# Patient Record
Sex: FEMALE | Race: WHITE | NOT HISPANIC OR LATINO | URBAN - METROPOLITAN AREA
[De-identification: names, ages, dates, MRNs, and addresses within clinical notes are randomized per-mention and may not be internally consistent; named-entity substitution may affect disease eponyms.]

---

## 2018-06-16 ENCOUNTER — EMERGENCY (EMERGENCY)
Facility: HOSPITAL | Age: 73
LOS: 1 days | Discharge: HOME | End: 2018-06-16
Attending: EMERGENCY MEDICINE | Admitting: EMERGENCY MEDICINE
Payer: MEDICARE

## 2018-06-16 VITALS
OXYGEN SATURATION: 97 % | DIASTOLIC BLOOD PRESSURE: 72 MMHG | TEMPERATURE: 98 F | HEART RATE: 75 BPM | RESPIRATION RATE: 16 BRPM | SYSTOLIC BLOOD PRESSURE: 113 MMHG

## 2018-06-16 LAB
ALBUMIN SERPL ELPH-MCNC: 4.3 G/DL — SIGNIFICANT CHANGE UP (ref 3.3–5)
ALP SERPL-CCNC: 80 U/L — SIGNIFICANT CHANGE UP (ref 40–120)
ALT FLD-CCNC: 23 U/L — SIGNIFICANT CHANGE UP (ref 10–45)
ANION GAP SERPL CALC-SCNC: 15 MMOL/L — SIGNIFICANT CHANGE UP (ref 5–17)
AST SERPL-CCNC: 22 U/L — SIGNIFICANT CHANGE UP (ref 10–40)
BASOPHILS # BLD AUTO: 0.2 K/UL — SIGNIFICANT CHANGE UP (ref 0–0.2)
BASOPHILS NFR BLD AUTO: 1.8 % — SIGNIFICANT CHANGE UP (ref 0–2)
BILIRUB SERPL-MCNC: 0.2 MG/DL — SIGNIFICANT CHANGE UP (ref 0.2–1.2)
BUN SERPL-MCNC: 18 MG/DL — SIGNIFICANT CHANGE UP (ref 7–23)
CALCIUM SERPL-MCNC: 9.6 MG/DL — SIGNIFICANT CHANGE UP (ref 8.4–10.5)
CHLORIDE SERPL-SCNC: 104 MMOL/L — SIGNIFICANT CHANGE UP (ref 96–108)
CO2 SERPL-SCNC: 24 MMOL/L — SIGNIFICANT CHANGE UP (ref 22–31)
CREAT SERPL-MCNC: 0.85 MG/DL — SIGNIFICANT CHANGE UP (ref 0.5–1.3)
EOSINOPHIL # BLD AUTO: 0.3 K/UL — SIGNIFICANT CHANGE UP (ref 0–0.5)
EOSINOPHIL NFR BLD AUTO: 3 % — SIGNIFICANT CHANGE UP (ref 0–6)
GLUCOSE SERPL-MCNC: 105 MG/DL — HIGH (ref 70–99)
HCT VFR BLD CALC: 41.3 % — SIGNIFICANT CHANGE UP (ref 34.5–45)
HGB BLD-MCNC: 14.3 G/DL — SIGNIFICANT CHANGE UP (ref 11.5–15.5)
LYMPHOCYTES # BLD AUTO: 4.5 K/UL — HIGH (ref 1–3.3)
LYMPHOCYTES # BLD AUTO: 49.2 % — HIGH (ref 13–44)
MCHC RBC-ENTMCNC: 33.2 PG — SIGNIFICANT CHANGE UP (ref 27–34)
MCHC RBC-ENTMCNC: 34.6 GM/DL — SIGNIFICANT CHANGE UP (ref 32–36)
MCV RBC AUTO: 95.9 FL — SIGNIFICANT CHANGE UP (ref 80–100)
MONOCYTES # BLD AUTO: 0.6 K/UL — SIGNIFICANT CHANGE UP (ref 0–0.9)
MONOCYTES NFR BLD AUTO: 6.2 % — SIGNIFICANT CHANGE UP (ref 2–14)
NEUTROPHILS # BLD AUTO: 3.7 K/UL — SIGNIFICANT CHANGE UP (ref 1.8–7.4)
NEUTROPHILS NFR BLD AUTO: 39.8 % — LOW (ref 43–77)
PLATELET # BLD AUTO: 324 K/UL — SIGNIFICANT CHANGE UP (ref 150–400)
POTASSIUM SERPL-MCNC: 3.7 MMOL/L — SIGNIFICANT CHANGE UP (ref 3.5–5.3)
POTASSIUM SERPL-SCNC: 3.7 MMOL/L — SIGNIFICANT CHANGE UP (ref 3.5–5.3)
PROT SERPL-MCNC: 7.6 G/DL — SIGNIFICANT CHANGE UP (ref 6–8.3)
RBC # BLD: 4.31 M/UL — SIGNIFICANT CHANGE UP (ref 3.8–5.2)
RBC # FLD: 11.7 % — SIGNIFICANT CHANGE UP (ref 10.3–14.5)
SODIUM SERPL-SCNC: 143 MMOL/L — SIGNIFICANT CHANGE UP (ref 135–145)
TROPONIN T, HIGH SENSITIVITY RESULT: <6 NG/L — SIGNIFICANT CHANGE UP (ref 0–51)
WBC # BLD: 9.2 K/UL — SIGNIFICANT CHANGE UP (ref 3.8–10.5)
WBC # FLD AUTO: 9.2 K/UL — SIGNIFICANT CHANGE UP (ref 3.8–10.5)

## 2018-06-16 PROCEDURE — 93010 ELECTROCARDIOGRAM REPORT: CPT

## 2018-06-16 PROCEDURE — 71046 X-RAY EXAM CHEST 2 VIEWS: CPT | Mod: 26

## 2018-06-16 PROCEDURE — 99220: CPT

## 2018-06-16 RX ORDER — SODIUM CHLORIDE 9 MG/ML
3 INJECTION INTRAMUSCULAR; INTRAVENOUS; SUBCUTANEOUS EVERY 8 HOURS
Qty: 0 | Refills: 0 | Status: DISCONTINUED | OUTPATIENT
Start: 2018-06-16 | End: 2018-06-20

## 2018-06-16 RX ORDER — ASPIRIN/CALCIUM CARB/MAGNESIUM 324 MG
162 TABLET ORAL ONCE
Qty: 0 | Refills: 0 | Status: COMPLETED | OUTPATIENT
Start: 2018-06-16 | End: 2018-06-16

## 2018-06-16 RX ADMIN — Medication 162 MILLIGRAM(S): at 22:30

## 2018-06-16 NOTE — ED PROVIDER NOTE - MEDICAL DECISION MAKING DETAILS
72F hx htn, smoking p/w cp. Concerning story for ACS. Heart score 4 without trop. Will get delta trops and cdu for stress. Not concerned for dissection, pe, ptx, borhaave.

## 2018-06-16 NOTE — ED PROVIDER NOTE - OBJECTIVE STATEMENT
72F hx htn p/w chest pain. Patient was at dinner 1 hour ago when she developed sudden onset R jaw pain/pressure radiating down neck into middle chest. Described as squeezing, associated with nausea, sob, lasted for approximately 10 minutes and then resolved. Patient has a history of smoking and took a baby asa pta. Denies vomiting, diaphoresis, back pain, lower extremity edema, pleuritic pain, recent travel, cough, fevers/chills, no other complaints.

## 2018-06-16 NOTE — ED PROVIDER NOTE - MUSCULOSKELETAL, MLM
Spine appears normal, range of motion is not limited, no muscle or joint tenderness. 5/5 strength in distal extremities b/l

## 2018-06-16 NOTE — ED PROVIDER NOTE - NS ED ROS FT
CONST: no fevers, no chills  EYES: no pain  ENT: no sore throat   CV: chest pressure radiates to jaw/neck  RESP: +sob  ABD: no abdominal pain +nausea  : no dysuria  MSK: no back pain  NEURO: no headache or additional neurologic complaints  HEME: no easy bleeding  SKIN:  no rash

## 2018-06-16 NOTE — ED ADULT NURSE NOTE - OBJECTIVE STATEMENT
Pt presents to the ED with sudden onset chest and jaw pain. Pt AXOX3 with spouse at bedside. Pt reports she was eating dinner and suddenly experienced R sided jaw pain along with nonradiating midsternal chest pain. Pain resolved after 5 minutes, accompanied by nausea and diaphoresis, all symptoms resolved within 5 minutes. Pt took 81mg of ASA at end of symptoms. Pt denies vomiting. Pt experienced episode of shortness of breath during episode, resolved at same time of chest pain, pt denies chest pain at this time, no shortness of breath, skins color normal for age and race. Breathing unlabored and symmetrical, lung sounds clear. No diarrhea, no dysuria, no hematuria reported. Pt endorses similar episode of pain several weeks ago.

## 2018-06-16 NOTE — ED PROVIDER NOTE - ATTENDING CONTRIBUTION TO CARE
Nemes - 73yo F w HTN, smoker, in the ER for 10 mins episode of R jaw pain radiating into mid chest, assoc w SOB, nausea, diaphoresis, dizziness. Similar stx 2x prior but no MD miller. No recent cardiac w/u. Asymptomatic in the ER. VS, exam wnl, lungs/cardiac clear. No tenderness./bruits over neck/chest. No leg edema. Likely ACS, low suspicion for PE/dissection. Will get cardiac w/u, likely CDU for stress in the am.

## 2018-06-17 LAB
CHOLEST SERPL-MCNC: 221 MG/DL — HIGH (ref 10–199)
HBA1C BLD-MCNC: 6.2 % — HIGH (ref 4–5.6)
HDLC SERPL-MCNC: 61 MG/DL — SIGNIFICANT CHANGE UP (ref 40–125)
LIPID PNL WITH DIRECT LDL SERPL: 115 MG/DL — SIGNIFICANT CHANGE UP
TOTAL CHOLESTEROL/HDL RATIO MEASUREMENT: 3.6 RATIO — SIGNIFICANT CHANGE UP (ref 3.3–7.1)
TRIGL SERPL-MCNC: 226 MG/DL — HIGH (ref 10–149)
TROPONIN T, HIGH SENSITIVITY RESULT: <6 NG/L — SIGNIFICANT CHANGE UP (ref 0–51)

## 2018-06-17 PROCEDURE — 99226: CPT

## 2018-06-17 PROCEDURE — 93018 CV STRESS TEST I&R ONLY: CPT

## 2018-06-17 PROCEDURE — 93016 CV STRESS TEST SUPVJ ONLY: CPT

## 2018-06-17 PROCEDURE — 78452 HT MUSCLE IMAGE SPECT MULT: CPT | Mod: 26

## 2018-06-17 PROCEDURE — 99283 EMERGENCY DEPT VISIT LOW MDM: CPT

## 2018-06-17 RX ORDER — METOPROLOL TARTRATE 50 MG
50 TABLET ORAL ONCE
Qty: 0 | Refills: 0 | Status: COMPLETED | OUTPATIENT
Start: 2018-06-18 | End: 2018-06-18

## 2018-06-17 RX ORDER — SODIUM CHLORIDE 9 MG/ML
1000 INJECTION INTRAMUSCULAR; INTRAVENOUS; SUBCUTANEOUS
Qty: 0 | Refills: 0 | Status: DISCONTINUED | OUTPATIENT
Start: 2018-06-17 | End: 2018-06-20

## 2018-06-17 RX ORDER — LEVOTHYROXINE SODIUM 125 MCG
88 TABLET ORAL DAILY
Qty: 0 | Refills: 0 | Status: DISCONTINUED | OUTPATIENT
Start: 2018-06-17 | End: 2018-06-20

## 2018-06-17 RX ORDER — LOSARTAN POTASSIUM 100 MG/1
50 TABLET, FILM COATED ORAL DAILY
Qty: 0 | Refills: 0 | Status: DISCONTINUED | OUTPATIENT
Start: 2018-06-17 | End: 2018-06-18

## 2018-06-17 RX ORDER — METOPROLOL TARTRATE 50 MG
50 TABLET ORAL ONCE
Qty: 0 | Refills: 0 | Status: COMPLETED | OUTPATIENT
Start: 2018-06-17 | End: 2018-06-18

## 2018-06-17 RX ADMIN — SODIUM CHLORIDE 3 MILLILITER(S): 9 INJECTION INTRAMUSCULAR; INTRAVENOUS; SUBCUTANEOUS at 22:01

## 2018-06-17 RX ADMIN — Medication 88 MICROGRAM(S): at 05:32

## 2018-06-17 RX ADMIN — SODIUM CHLORIDE 3 MILLILITER(S): 9 INJECTION INTRAMUSCULAR; INTRAVENOUS; SUBCUTANEOUS at 05:26

## 2018-06-17 RX ADMIN — SODIUM CHLORIDE 125 MILLILITER(S): 9 INJECTION INTRAMUSCULAR; INTRAVENOUS; SUBCUTANEOUS at 17:37

## 2018-06-17 RX ADMIN — SODIUM CHLORIDE 3 MILLILITER(S): 9 INJECTION INTRAMUSCULAR; INTRAVENOUS; SUBCUTANEOUS at 16:51

## 2018-06-17 RX ADMIN — LOSARTAN POTASSIUM 50 MILLIGRAM(S): 100 TABLET, FILM COATED ORAL at 17:13

## 2018-06-17 NOTE — CONSULT NOTE ADULT - SUBJECTIVE AND OBJECTIVE BOX
Ms. Del Rio is a 72-year-old woman with hypertension, pre-diabetes, and prior tobacco use who developed jaw and chest pain while at dinner.  This is the second episode of jaw pain she has recently experienced.  She denies any exertional chest pain during her exercise stress today with showed a small, mild reversible perfusion defect in the basal lateral wall.  She is currently asymptomatic.    Vital Signs Last 24 Hrs  T(C): 36.8 (17 Jun 2018 16:42), Max: 36.8 (17 Jun 2018 07:32)  T(F): 98.2 (17 Jun 2018 16:42), Max: 98.2 (17 Jun 2018 07:32)  HR: 52 (17 Jun 2018 16:42) (52 - 104)  BP: 127/66 (17 Jun 2018 16:42) (106/68 - 140/68)  BP(mean): --  RR: 18 (17 Jun 2018 16:42) (16 - 19)  SpO2: 95% (17 Jun 2018 16:42) (95% - 98%)    General - awake, alert, comfortable  CV - RRR  Lungs- CTAB  Abdomen - S/NT/ND  Extremities - no CALDERON    ECG 6.16.18   NST   NS TW changes    Telemetry  Sinus 70s-100  PVCs  Bigeminy    06-16    143  |  104  |  18  ----------------------------<  105<H>  3.7   |  24  |  0.85    Ca    9.6      16 Jun 2018 22:22    TPro  7.6  /  Alb  4.3  /  TBili  0.2  /  DBili  x   /  AST  22  /  ALT  23  /  AlkPhos  80  06-16                          14.3   9.2   )-----------( 324      ( 16 Jun 2018 22:22 )             41.3     MEDICATIONS  (STANDING):  levothyroxine 88 MICROGram(s) Oral daily  losartan 50 milliGRAM(s) Oral daily  metoprolol tartrate 50 milliGRAM(s) Oral once  sodium chloride 0.9% lock flush 3 milliLiter(s) IV Push every 8 hours  sodium chloride 0.9%. 1000 milliLiter(s) (125 mL/Hr) IV Continuous <Continuous>      Impression:  72W Ms. Del Rio is a 72-year-old woman with hypertension, pre-diabetes, and prior tobacco use who developed jaw and chest pain while at dinner.  This is the second episode of jaw pain she has recently experienced.  She denies any exertional chest pain during her exercise stress today with showed a small, mild reversible perfusion defect in the basal lateral wall.  She is currently asymptomatic.    Vital Signs Last 24 Hrs  T(C): 36.8 (17 Jun 2018 16:42), Max: 36.8 (17 Jun 2018 07:32)  T(F): 98.2 (17 Jun 2018 16:42), Max: 98.2 (17 Jun 2018 07:32)  HR: 52 (17 Jun 2018 16:42) (52 - 104)  BP: 127/66 (17 Jun 2018 16:42) (106/68 - 140/68)  BP(mean): --  RR: 18 (17 Jun 2018 16:42) (16 - 19)  SpO2: 95% (17 Jun 2018 16:42) (95% - 98%)    General - awake, alert, comfortable  CV - RRR  Lungs- CTAB  Abdomen - S/NT/ND  Extremities - no CALDERON    ECG 6.16.18   NST   NS TW changes    Telemetry  Sinus 70s-100  PVCs  Bigeminy    06-16    143  |  104  |  18  ----------------------------<  105<H>  3.7   |  24  |  0.85    Ca    9.6      16 Jun 2018 22:22    TPro  7.6  /  Alb  4.3  /  TBili  0.2  /  DBili  x   /  AST  22  /  ALT  23  /  AlkPhos  80  06-16                          14.3   9.2   )-----------( 324      ( 16 Jun 2018 22:22 )             41.3     MEDICATIONS  (STANDING):  levothyroxine 88 MICROGram(s) Oral daily  losartan 50 milliGRAM(s) Oral daily  metoprolol tartrate 50 milliGRAM(s) Oral once  sodium chloride 0.9% lock flush 3 milliLiter(s) IV Push every 8 hours  sodium chloride 0.9%. 1000 milliLiter(s) (125 mL/Hr) IV Continuous <Continuous>    Impression:  72W  HTN  Pre-DM  H/o tobacco  Non-exertional chest and jaw pain  Small, reversible PD basal lateral wall  PVCs    Recommendations:  CCTA (has 18G IV)  Metoprolol 50 mg PO q8h  IVF 1L Ms. Del Rio is a 72-year-old woman with hypertension, pre-diabetes, and prior tobacco use who developed jaw and chest pain while at dinner.  This is the second episode of jaw pain she has recently experienced.  She denies any exertional chest pain during her exercise stress today which showed a small, mild reversible perfusion defect in the basal lateral wall.  She is currently asymptomatic.    Vital Signs Last 24 Hrs  T(C): 36.8 (17 Jun 2018 16:42), Max: 36.8 (17 Jun 2018 07:32)  T(F): 98.2 (17 Jun 2018 16:42), Max: 98.2 (17 Jun 2018 07:32)  HR: 52 (17 Jun 2018 16:42) (52 - 104)  BP: 127/66 (17 Jun 2018 16:42) (106/68 - 140/68)  RR: 18 (17 Jun 2018 16:42) (16 - 19)  SpO2: 95% (17 Jun 2018 16:42) (95% - 98%)    General - awake, alert, comfortable  CV - RRR  Lungs- CTAB  Abdomen - S/NT/ND  Extremities - no CALDERON    ECG 6.16.18   NST   NS TW changes    Telemetry  Sinus 70s-100  PVCs  Bigeminy    06-16    143  |  104  |  18  ----------------------------<  105<H>  3.7   |  24  |  0.85    Ca    9.6      16 Jun 2018 22:22    TPro  7.6  /  Alb  4.3  /  TBili  0.2  /  DBili  x   /  AST  22  /  ALT  23  /  AlkPhos  80  06-16                          14.3   9.2   )-----------( 324      ( 16 Jun 2018 22:22 )             41.3     MEDICATIONS  (STANDING):  levothyroxine 88 MICROGram(s) Oral daily  losartan 50 milliGRAM(s) Oral daily  metoprolol tartrate 50 milliGRAM(s) Oral once  sodium chloride 0.9% lock flush 3 milliLiter(s) IV Push every 8 hours  sodium chloride 0.9%. 1000 milliLiter(s) (125 mL/Hr) IV Continuous <Continuous>    Impression:  72W  HTN  Pre-DM  H/o tobacco  Non-exertional chest and jaw pain  Small, reversible PD basal lateral wall on MPI  PVCs    Recommendations:  CCTA (has 18G IV)  Metoprolol 50 mg PO q8h  IVF 1L

## 2018-06-17 NOTE — ED CDU PROVIDER DISPOSITION NOTE - ATTENDING CONTRIBUTION TO CARE
Attending MD Ribera:   I personally have seen and examined this patient.  Physician assistant note reviewed and agree on plan of care and except where noted.  See HPI, PE, and MDM for details.

## 2018-06-17 NOTE — ED CDU PROVIDER DISPOSITION NOTE - CLINICAL COURSE
72F PMHx HTN, hypothyroidism, former smoker p/w chest pain at approx 9PM today. Patient was at dinner when she developed sudden onset R sided jaw pain/pressure which radiated down her neck into middle chest. Described as squeezing, associated with nausea, sob, lasted for approximately 10 minutes and then resolved. Patient also had a frontal headache after onset of CP. Patient had relief of symptoms after she took ASA 81mg prior to arrival. Denies vomiting, diaphoresis, back pain, lower extremity edema, pleuritic pain, recent travel, cough, fevers/chills, no other complaints. Has no cardiologist and never had stress test before. Denied primary relatives expiring from cardiac related diseases.   Takes Losartan 50mg once daily and Synthroid 88mcg once daily.  (Took medications today)  ED course: Labs unremarkable including negative troponin. CXR negative. EKG showed no ischemic changes. Patient transferred to CDU for continuous monitoring with tele, delta trop, delta EKG, and exercise nuclear stress in AM. Nuclear stress test revealed________ 72F PMHx HTN, hypothyroidism, former smoker p/w chest pain at approx 9PM today. Patient was at dinner when she developed sudden onset R sided jaw pain/pressure which radiated down her neck into middle chest. Described as squeezing, associated with nausea, sob, lasted for approximately 10 minutes and then resolved. Patient also had a frontal headache after onset of CP. Patient had relief of symptoms after she took ASA 81mg prior to arrival. Denies vomiting, diaphoresis, back pain, lower extremity edema, pleuritic pain, recent travel, cough, fevers/chills, no other complaints. Has no cardiologist and never had stress test before. Denied primary relatives expiring from cardiac related diseases.   Takes Losartan 50mg once daily and Synthroid 88mcg once daily.  (Took medications today)  ED course: Labs unremarkable including negative troponin. CXR negative. EKG showed no ischemic changes. Received ASA 162mg while in ED. Patient transferred to CDU for continuous monitoring with tele, delta trop, delta EKG, and exercise nuclear stress in AM. Nuclear stress test revealed________ 72F PMHx HTN, hypothyroidism, former smoker p/w chest pain at approx 9PM today. Patient was at dinner when she developed sudden onset R sided jaw pain/pressure which radiated down her neck into middle chest. Described as squeezing, associated with nausea, sob, lasted for approximately 10 minutes and then resolved. Patient also had a frontal headache after onset of CP. Patient had relief of symptoms after she took ASA 81mg prior to arrival. Denies vomiting, diaphoresis, back pain, lower extremity edema, pleuritic pain, recent travel, cough, fevers/chills, no other complaints. Has no cardiologist and never had stress test before. Denied primary relatives expiring from cardiac related diseases.   Takes Losartan 50mg once daily and Synthroid 88mcg once daily.  (Took medications today)  ED course: Labs unremarkable including negative troponin. CXR negative. EKG showed no ischemic changes. Received ASA 162mg while in ED. Patient transferred to CDU for continuous monitoring with tele, delta trop, delta EKG, and exercise nuclear stress in AM. Nuclear stress test revealed a mild area of ischemia. 72F PMHx HTN, hypothyroidism, former smoker p/w chest pain at approx 9PM today. Patient was at dinner when she developed sudden onset R sided jaw pain/pressure which radiated down her neck into middle chest. Described as squeezing, associated with nausea, sob, lasted for approximately 10 minutes and then resolved. Patient also had a frontal headache after onset of CP. Patient had relief of symptoms after she took ASA 81mg prior to arrival. Denies vomiting, diaphoresis, back pain, lower extremity edema, pleuritic pain, recent travel, cough, fevers/chills, no other complaints. Has no cardiologist and never had stress test before. Denied primary relatives expiring from cardiac related diseases.   Takes Losartan 50mg once daily and Synthroid 88mcg once daily.  (Took medications today)  ED course: Labs unremarkable including negative troponin. CXR negative. EKG showed no ischemic changes. Received ASA 162mg while in ED. Patient transferred to CDU for continuous monitoring with tele, delta trop, delta EKG, and exercise nuclear stress in AM. Nuclear stress test revealed a mild area of ischemia. after evaluation by the cardiology team, they recommended she stay overnight for ct coronary in the morning and work on rate control over night with beta blocker and fluid. 72F PMHx HTN, hypothyroidism, former smoker p/w chest pain at approx 9PM today. Patient was at dinner when she developed sudden onset R sided jaw pain/pressure which radiated down her neck into middle chest. Described as squeezing, associated with nausea, sob, lasted for approximately 10 minutes and then resolved. Patient also had a frontal headache after onset of CP. Patient had relief of symptoms after she took ASA 81mg prior to arrival. Denies vomiting, diaphoresis, back pain, lower extremity edema, pleuritic pain, recent travel, cough, fevers/chills, no other complaints. Has no cardiologist and never had stress test before. Denied primary relatives expiring from cardiac related diseases.   Takes Losartan 50mg once daily and Synthroid 88mcg once daily.  (Took medications today)  ED course: Labs unremarkable including negative troponin. CXR negative. EKG showed no ischemic changes. Received ASA 162mg while in ED. Patient transferred to CDU for continuous monitoring with tele, delta trop, delta EKG, and exercise nuclear stress in AM. Nuclear stress test revealed a mild area of ischemia. Patient evaluated by Dr. Solis of cardiology recommended CDU for CTC. CTC showed, No evidence of significant obstructive coronary artery disease. Minimal (< 30%) mixed disease in proximal-mid RCA. Coronary artery calcium present (Agatston score 6).  PFO present. Patient then re-evaluated by Cardiology, recommend ASA 81mg daily, Statin and follow up with Cardiology outpatient. c/d/w Dr. Ribera

## 2018-06-17 NOTE — ED CDU PROVIDER INITIAL DAY NOTE - DETAILS
72F PMHx HTN, hypothyroidism, former smoker p/w chest pain at approx 9PM today  -continuous monitoring and frequent re-evals   -delta trop and EKG  -exercise nuclear stress in AM  -Discussed case with Dr. Micha Danielle

## 2018-06-17 NOTE — ED CDU PROVIDER INITIAL DAY NOTE - MEDICAL DECISION MAKING DETAILS
Nemes - 71yo F w HTN, smoker, in the ER for 10 mins episode of R jaw pain radiating into mid chest, assoc w SOB, nausea, diaphoresis, dizziness. Similar stx 2x prior but no MD bhatt. No recent cardiac w/u. Asymptomatic in the ER. VS, exam wnl, lungs/cardiac clear. No tenderness/bruits over neck/chest. No leg edema. Likely ACS, low suspicion for PE/dissection. Cardiac w/u neg in the ED, will observe in CDU for stress in the am.

## 2018-06-17 NOTE — ED CDU PROVIDER INITIAL DAY NOTE - OBJECTIVE STATEMENT
72F PMHx HTN, hypothyroidism, former smoker p/w chest pain at approx 9PM today. Patient was at dinner when she developed sudden onset R sided jaw pain/pressure which radiated down her neck into middle chest. Described as squeezing, associated with nausea, sob, lasted for approximately 10 minutes and then resolved. Patient also had a frontal headache after onset of CP. Patient had relief of symptoms after she took ASA 81mg prior to arrival. Denies vomiting, diaphoresis, back pain, lower extremity edema, pleuritic pain, recent travel, cough, fevers/chills, no other complaints. Has no cardiologist and never had stress test before. Denied primary relatives expiring from cardiac related diseases.     Takes Losartan 50mg once daily and Synthroid 88mcg once daily.  (Took medications today)    PMD: Dr. Niraj Dinero [phone]    ED course: Labs unremarkable including negative troponin. CXR negative. EKG showed no ischemic changes. Patient transferred to CDU for continuous monitoring with tele, delta trop, delta EKG, and exercise nuclear stress in AM 72F PMHx HTN, hypothyroidism, former smoker p/w chest pain at approx 9PM today. Patient was at dinner when she developed sudden onset R sided jaw pain/pressure which radiated down her neck into middle chest. Described as squeezing, associated with nausea, sob, lasted for approximately 10 minutes and then resolved. Patient also had a frontal headache after onset of CP. Patient had relief of symptoms after she took ASA 81mg prior to arrival. Denies vomiting, diaphoresis, back pain, lower extremity edema, pleuritic pain, recent travel, cough, fevers/chills, no other complaints. Has no cardiologist and never had stress test before. Denied primary relatives expiring from cardiac related diseases.     Takes Losartan 50mg once daily and Synthroid 88mcg once daily.  (Took medications today)    PMD: Dr. Niraj Dinero [phone]    ED course: Labs unremarkable including negative troponin. CXR negative. EKG showed no ischemic changes. Received ASA 162mg while in ED. Patient transferred to CDU for continuous monitoring with tele, delta trop, delta EKG, and exercise nuclear stress in AM

## 2018-06-17 NOTE — ED CDU PROVIDER DISPOSITION NOTE - PLAN OF CARE
1. Follow up with your PMD and/or cardiologist within 48-72 hours.   2. Show copies of your reports given to you. Recommend Aspirin 81mg over the counter daily until further evaluation.  Take all of your other medications as previously prescribed.   3. Worsening or continued chest pain, shortness of breath, weakness, return to ED. 1. Follow up with your PMD and/or cardiologist within 48-72 hours.   2. Show copies of your reports given to you. Recommend Aspirin 81mg over the counter daily until further evaluation. Also take Atorvastatin 80 mg once a day night.  Take all of your other medications as previously prescribed.   3. Worsening or continued chest pain, shortness of breath, weakness, return to ED. 1. Follow up with your PMD and/or cardiologist within 48-72 hours. If you do not have a cardiologist you can follow up with [facility] Dr [facility]- [phone], Dr Kumari [phone] or Dr. Acevedo [phone]  2. Show copies of your reports given to you. Recommend Aspirin 81mg over the counter daily until further evaluation. Also take Atorvastatin 80 mg once a day night.  Take all of your other medications as previously prescribed.   3. Worsening or continued chest pain, shortness of breath, weakness, return to ED.

## 2018-06-17 NOTE — ED CDU PROVIDER INITIAL DAY NOTE - PROGRESS NOTE DETAILS
SARY Daugherty: Patient sleeping no events noted over tele CDU NOTE SARY SCHMIDT: NAD VSS.  Patient resting comfortably and has no current complaints. no events on tele. awaiting stress returned from stress. NAD. awaiting results, no events on tele. - Светлана Cabezas PA-C Dr Solis at bedside regarding the stress, based on patients age and risk factor profile recommending that patient stay for ct coronary tomorrow morning, recommending a liter of fluid, metoprolol 50 now and then again in AM. will discuss plan with Dr. Wilbur Cabezas PA-C pt in CDU for chest pain and stress test; no cp at this time all symptoms resolved exam wnl heart rrr s1s2 lungs ctab le no swelling; received nuclear stess test with mild ichemia - cardiology consulted recommend CTA coronaries--will keep until tomorrow in CDU for ct coronaries in the morning pt asymptomatic at this time SARY Daugherty:m Patient seen at bedside in NAD.  VSS.  Patient resting comfortably without complaints. No events noted over tele. Patient scheduled for CTC in AM

## 2018-06-18 VITALS
HEART RATE: 62 BPM | OXYGEN SATURATION: 95 % | SYSTOLIC BLOOD PRESSURE: 142 MMHG | RESPIRATION RATE: 18 BRPM | TEMPERATURE: 98 F | DIASTOLIC BLOOD PRESSURE: 67 MMHG

## 2018-06-18 PROCEDURE — 75574 CT ANGIO HRT W/3D IMAGE: CPT | Mod: 26

## 2018-06-18 PROCEDURE — 71046 X-RAY EXAM CHEST 2 VIEWS: CPT

## 2018-06-18 PROCEDURE — 80061 LIPID PANEL: CPT

## 2018-06-18 PROCEDURE — 85027 COMPLETE CBC AUTOMATED: CPT

## 2018-06-18 PROCEDURE — 83036 HEMOGLOBIN GLYCOSYLATED A1C: CPT

## 2018-06-18 PROCEDURE — 75574 CT ANGIO HRT W/3D IMAGE: CPT

## 2018-06-18 PROCEDURE — 93017 CV STRESS TEST TRACING ONLY: CPT

## 2018-06-18 PROCEDURE — 99284 EMERGENCY DEPT VISIT MOD MDM: CPT | Mod: 25

## 2018-06-18 PROCEDURE — 99217: CPT

## 2018-06-18 PROCEDURE — 84484 ASSAY OF TROPONIN QUANT: CPT

## 2018-06-18 PROCEDURE — 80053 COMPREHEN METABOLIC PANEL: CPT

## 2018-06-18 PROCEDURE — 78452 HT MUSCLE IMAGE SPECT MULT: CPT

## 2018-06-18 PROCEDURE — 99285 EMERGENCY DEPT VISIT HI MDM: CPT

## 2018-06-18 PROCEDURE — A9500: CPT

## 2018-06-18 PROCEDURE — G0378: CPT

## 2018-06-18 PROCEDURE — 93005 ELECTROCARDIOGRAM TRACING: CPT | Mod: 76

## 2018-06-18 RX ORDER — SODIUM CHLORIDE 9 MG/ML
1000 INJECTION INTRAMUSCULAR; INTRAVENOUS; SUBCUTANEOUS ONCE
Qty: 0 | Refills: 0 | Status: COMPLETED | OUTPATIENT
Start: 2018-06-18 | End: 2018-06-18

## 2018-06-18 RX ORDER — ACETAMINOPHEN 500 MG
650 TABLET ORAL ONCE
Qty: 0 | Refills: 0 | Status: COMPLETED | OUTPATIENT
Start: 2018-06-18 | End: 2018-06-18

## 2018-06-18 RX ADMIN — SODIUM CHLORIDE 1000 MILLILITER(S): 9 INJECTION INTRAMUSCULAR; INTRAVENOUS; SUBCUTANEOUS at 05:30

## 2018-06-18 RX ADMIN — Medication 650 MILLIGRAM(S): at 11:24

## 2018-06-18 RX ADMIN — Medication 50 MILLIGRAM(S): at 06:21

## 2018-06-18 RX ADMIN — SODIUM CHLORIDE 125 MILLILITER(S): 9 INJECTION INTRAMUSCULAR; INTRAVENOUS; SUBCUTANEOUS at 08:24

## 2018-06-18 RX ADMIN — Medication 50 MILLIGRAM(S): at 08:23

## 2018-06-18 RX ADMIN — SODIUM CHLORIDE 3 MILLILITER(S): 9 INJECTION INTRAMUSCULAR; INTRAVENOUS; SUBCUTANEOUS at 06:02

## 2018-06-18 RX ADMIN — Medication 88 MICROGRAM(S): at 06:19

## 2018-06-18 NOTE — ED ADULT NURSE REASSESSMENT NOTE - COMFORT CARE
darkened lights/warm blanket provided/ambulated to bathroom/plan of care explained/po fluids offered/repositioned
side rails up/ambulated to bathroom/plan of care explained

## 2018-06-18 NOTE — ED ADULT NURSE REASSESSMENT NOTE - NS ED NURSE REASSESS COMMENT FT1
14.00 Pt  was  Evaluated by Dr Bacilio STACY MD & Cardiologist . Pt tolerated the  PO challenges well. Pt denies N/V/D fever chills CP SOB No respiratory Distress. Pt is discharged Ml out  SARY Mcnulty  Explained the Pt discharge plan & follow up  & he  gave he discharge summary . Pt verbalized the understanding on follow up care pt had the stable vitals pt stable to go home
resting in bed, asymptomatic, vitals stable, denies c/o
sleeping, easily arousable, denies c/o, NSR on monitor, NS infusing 125 ml/hr, awaiting CT coronary tomorrow morning, metoprolol 50 mg to be given at 530 am - pt aware
Received pt from PRINCE Welsh , received pt alert and responsive, oriented x4, denies any respiratory distress, SOB, or difficulty breathing. Pt transferred to CDU for R sided jaw pain with radiation to middle of chest accompanied with nausea, diaphoresis. Pt is currently asymptomatic with no cardiac symptoms at this time. Pt is SR on monitor hr: 71. Pending stress test in AM l, pt is aware not to consume caffeine  prior to test, verbalized understanding. IV in place, patent and free of signs of infiltration, V/S stable, pt afebrile, pt denies pain at this time. Pt educated on unit and unit rules, instructed patient to notify RN of any needed assistance, Pt verbalizes understanding, Call bell placed within reach. Safety maintained. Will continue to monitor.
07.00 Am Received Report from  PRINCE Blanc   07  30 Am Pt is reassessed. Pt denies CP SOB N/V/D fever chills vital signs stable IV site changed to Right AC with 18 G . Comfort care & Safety measures continued. Pt  is awaiting for CTC  Continue to monitor

## 2018-06-18 NOTE — PROGRESS NOTE ADULT - SUBJECTIVE AND OBJECTIVE BOX
· Reason for Referral/Consultation:	Chest pain	      · Subjective and Objective: 	  Ms. Del Rio is a 72-year-old woman with hypertension, pre-diabetes, and prior tobacco use who developed jaw and chest pain while at dinner.  This is the second episode of jaw pain she has recently experienced.  She denies any exertional chest pain during her exercise stress today which showed a small, mild reversible perfusion defect in the basal lateral wall.  She is currently asymptomatic.  ----------  24 hr Events  - unremarkable CTA  - Doing well  - No CP/Palps/SOB  -----------    Vital Signs Last 24 Hrs  T(C): 36.8 (17 Jun 2018 16:42), Max: 36.8 (17 Jun 2018 07:32)  T(F): 98.2 (17 Jun 2018 16:42), Max: 98.2 (17 Jun 2018 07:32)  HR: 52 (17 Jun 2018 16:42) (52 - 104)  BP: 127/66 (17 Jun 2018 16:42) (106/68 - 140/68)  RR: 18 (17 Jun 2018 16:42) (16 - 19)  SpO2: 95% (17 Jun 2018 16:42) (95% - 98%)    General - awake, alert, comfortable  CV - RRR  Lungs- CTAB  Abdomen - S/NT/ND  Extremities - no CALDERON    ECG 6.16.18   NST   NS TW changes    Telemetry  Sinus 70s-100  PVCs  Bigeminy    06-16    143  |  104  |  18  ----------------------------<  105<H>  3.7   |  24  |  0.85    Ca    9.6      16 Jun 2018 22:22    TPro  7.6  /  Alb  4.3  /  TBili  0.2  /  DBili  x   /  AST  22  /  ALT  23  /  AlkPhos  80  06-16                          14.3   9.2   )-----------( 324      ( 16 Jun 2018 22:22 )             41.3     MEDICATIONS  (STANDING):  levothyroxine 88 MICROGram(s) Oral daily  losartan 50 milliGRAM(s) Oral daily  metoprolol tartrate 50 milliGRAM(s) Oral once  sodium chloride 0.9% lock flush 3 milliLiter(s) IV Push every 8 hours  sodium chloride 0.9%. 1000 milliLiter(s) (125 mL/Hr) IV Continuous <Continuous>\  < from: CT Heart with Coronaries (06.18.18 @ 09:10) >    PROCEDURE DATE:  06/18/2018            INTERPRETATION:  Indication: 72 year old female with cardiac risk factors   and atypical chest pain; evaluate for coronary artery disease.    Procedure:  Informed consent was obtained from the patient and there were   no complications during the procedure. Noncontrast ECG-gated   320-multidetector computed tomography of the heart was performed followed   by a contrast injection of 75 mL Omnipaque 350 for ECG-gated coronary CT   angiography. The patient was pretreated with metoprolol 15 mg intravenous   and nitroglycerin 0.4 mg sublingual. Resting heart rate at the time of   examination was 58 beats/min.  Three-dimensional volume rendering and   multiplanar reconstruction images were generated. Images were   reconstructed using a F[employer] Workstation. The image quality is good.    FINDINGS:    Cardiovascular:    Coronary:   Coronary artery calcium Agatston score:    Left main (LM) coronary artery: 0  Left anterior descending (LAD) coronary artery: 0  Left circumflex (LCX) coronary artery: 0  Right coronary artery (RCA): 6  Total: 6    Right-dominant coronary circulation.   The LM coronary artery gives rise to the left anterior descending (LAD)   and left circumflex arteries (LCX). There is no obstructive coronary   artery disease within the LM.    The LAD coronary artery gives rise to septal  and diagonal   branches before terminating around the left ventricular apex.  There is   no significant obstructive coronary artery disease within the LAD system.   The LCX coronary artery gives rise to obtuse marginal branches before   terminating in the posterior AV groove. There is no significant   obstructive coronary artery disease within the LCX system.    The RCA gives rise to acute marginal branches before terminating in the   right posterior descending artery (PDA) and the right posterolateral   (RPL) branches. There is minimal (< 30%) mixed calcified/noncalcified   disease in the prox-mid RCA without significant obstructive coronary   artery disease within the RCA system.      There is no pericardial effusion.    No left atrial appendage thrombus  The cardiac chambers are qualitatively normal in size.  No thoracic aortic dissection is noted in the visualized thoracic aorta.    Mild calcification of the aortic root.   A patent foramen ovale (PFO) is present.     Non-cardiac:  No hilar and/or mediastinal adenopathy is noted. Calcified granuloma is   noted in the left upper lobe. Visualized osseous structures are within   normal limits.      IMPRESSION:    1.  No evidence of significant obstructive coronary artery disease.   Minimal (< 30%) mixed disease in proximal-mid RCA.   2.  Coronary artery calcium present (Agatston score 6).  The observed   calcium score is at the 42nd percentile for age, gender and   race/ethnicity.  3.  PFO present.                 NICOLA MENDOZA M.D., ATTENDING CARDIOLOGIST  This document has been electronically signed.  AMADOR CISNEROS M.D., ATTENDING RADIOLOGIST  This document has been electronically signed. Jun 18 2018 12:07PM              < end of copied text >      Impression:  72W  HTN  Pre-DM  Non-cardiac CP w neg CTA  OK to D/C Home

## 2018-06-18 NOTE — ED CDU PROVIDER SUBSEQUENT DAY NOTE - PROGRESS NOTE DETAILS
Patient seen at bedside in NAD.  VSS.  Patient resting comfortably without complaints. No events noted over tele -She Daugherty PA-C SARY Daugherty:Patient seen at bedside in NAD.  VSS.  Patient sleeping. No events noted over tele. Patient pending CTC in AM VSS NAD. Patient is resting comfortably and is without any complaints. no events on telemetry, pending CTC. I have personally performed a face to face diagnostic evaluation on this patient.  I have reviewed the ACP note and agree with the history, exam, and plan of care, except as noted.       72F with HTN, previous smoker observed in the CDU for chest pain, hsT indeterminate. Nuclear stress was abnormal 1 day prior, cardiology recommended CT coronary which patient received this morning, results are pending. Disposition and treatment plan pending CTC results and cardiology input. I have personally performed a face to face diagnostic evaluation on this patient.  I have reviewed the ACP note and agree with the history, exam, and plan of care, except as noted.       72F with HTN, previous smoker observed in the CDU for chest pain, cardiac biomarkers negative for MI. Nuclear stress was abnormal 1 day prior, cardiology recommended CT coronary which patient received this morning, results are pending. Disposition and treatment plan pending CTC results and cardiology input. Patient's CT coronary without obstructive CAD, cleared for discharge by cardiology Dr. Gibson, patient stable for discharge VSS NAD. Patient is resting comfortably and is without any complaints. Patient evaluated by Dr. Gibson, stable for discharge, out patient follow up.

## 2018-06-18 NOTE — ED ADULT NURSE REASSESSMENT NOTE - GENERAL PATIENT STATE
comfortable appearance/improvement verbalized/cooperative/resting/sleeping/smiling/interactive
comfortable appearance

## 2022-05-09 PROBLEM — E03.9 HYPOTHYROIDISM, UNSPECIFIED: Chronic | Status: ACTIVE | Noted: 2018-06-17

## 2022-05-09 PROBLEM — I10 ESSENTIAL (PRIMARY) HYPERTENSION: Chronic | Status: ACTIVE | Noted: 2018-06-17

## 2022-05-12 ENCOUNTER — FORM ENCOUNTER (OUTPATIENT)
Age: 77
End: 2022-05-12

## 2022-05-13 ENCOUNTER — APPOINTMENT (OUTPATIENT)
Dept: ORTHOPEDIC SURGERY | Facility: CLINIC | Age: 77
End: 2022-05-13
Payer: MEDICARE

## 2022-05-13 ENCOUNTER — APPOINTMENT (OUTPATIENT)
Dept: MRI IMAGING | Facility: CLINIC | Age: 77
End: 2022-05-13
Payer: MEDICARE

## 2022-05-13 VITALS — BODY MASS INDEX: 27.49 KG/M2 | HEIGHT: 65 IN | WEIGHT: 165 LBS

## 2022-05-13 DIAGNOSIS — Z78.9 OTHER SPECIFIED HEALTH STATUS: ICD-10-CM

## 2022-05-13 DIAGNOSIS — M79.671 PAIN IN RIGHT FOOT: ICD-10-CM

## 2022-05-13 PROBLEM — Z00.00 ENCOUNTER FOR PREVENTIVE HEALTH EXAMINATION: Status: ACTIVE | Noted: 2022-05-13

## 2022-05-13 PROCEDURE — 99204 OFFICE O/P NEW MOD 45 MIN: CPT

## 2022-05-13 PROCEDURE — 72100 X-RAY EXAM L-S SPINE 2/3 VWS: CPT | Mod: LT

## 2022-05-13 PROCEDURE — 73630 X-RAY EXAM OF FOOT: CPT | Mod: RT

## 2022-05-13 PROCEDURE — 72148 MRI LUMBAR SPINE W/O DYE: CPT

## 2022-05-13 RX ORDER — METHYLPREDNISOLONE 4 MG/1
4 TABLET ORAL
Qty: 1 | Refills: 0 | Status: ACTIVE | COMMUNITY
Start: 2022-05-13 | End: 1900-01-01

## 2022-05-13 NOTE — IMAGING
[FreeTextEntry1] : multi level narrowing worse at 5-1 and 2-3\par spondylolisthesis at 4-5 [de-identified] : normal [de-identified] : normal

## 2022-05-13 NOTE — HISTORY OF PRESENT ILLNESS
[Lower back] : lower back [7] : 7 [1] : 2 [Burning] : burning [Dull/Aching] : dull/aching [Tightness] : tightness [Squeezing] : squeezing [Constant] : constant [Meds] : meds [Physical therapy] : physical therapy [Walking] : walking [de-identified] : 5-13-22- She states 3 1/2 year h/o right sided lower back pain with radicular complaints down the right leg to the foot. Initially saw ortho put her in therapy without help. she notes over the last 2 months had a trip to [place] and leg symptoms into the foot more problematic with walking and driving. she has tried apap and advil without help. Has been going to a new hands on therapy place with some improvement in symptoms\par \par [facility] denies\par \par retired [] : no [FreeTextEntry1] : rt knee/rt foot [FreeTextEntry5] : patient states pain started 3 years ago. denies any injury or fall  [FreeTextEntry7] : down the legs

## 2022-05-13 NOTE — PHYSICAL EXAM
[Flexion] : flexion [Extension] : extension [Bending to left] : bending to left [Bending to right] : bending to right [Facet arthropathy] : Facet arthropathy [Disc space narrowing] : Disc space narrowing [Spondylolithesis] : Spondylolithesis [AP] : anteroposterior [There are no fractures, subluxations or dislocations. No significant abnormalities are seen] : There are no fractures, subluxations or dislocations. No significant abnormalities are seen [Right] : right foot and ankle [de-identified] : radicular complaints posterior right leg into the foot  [FreeTextEntry1] : subtle listehsis L4/5 [] : no swelling [FreeTextEntry8] : tender dorsal mid mid foot

## 2022-05-13 NOTE — ASSESSMENT
[FreeTextEntry1] : she has had other physician directed care and has been doing therapy with some help but still has radicular complaints. will start on medrol and continue therapy but also get mri. f/u 2 weeks if symptoms persist consider lesi with pain mgt

## 2022-05-20 ENCOUNTER — APPOINTMENT (OUTPATIENT)
Dept: ORTHOPEDIC SURGERY | Facility: CLINIC | Age: 77
End: 2022-05-20
Payer: MEDICARE

## 2022-05-20 VITALS — WEIGHT: 165 LBS | BODY MASS INDEX: 27.49 KG/M2 | HEIGHT: 65 IN

## 2022-05-20 DIAGNOSIS — M54.16 RADICULOPATHY, LUMBAR REGION: ICD-10-CM

## 2022-05-20 DIAGNOSIS — M48.061 SPINAL STENOSIS, LUMBAR REGION WITHOUT NEUROGENIC CLAUDICATION: ICD-10-CM

## 2022-05-20 PROCEDURE — 99213 OFFICE O/P EST LOW 20 MIN: CPT

## 2022-05-20 NOTE — HISTORY OF PRESENT ILLNESS
[Lower back] : lower back [7] : 7 [1] : 2 [Burning] : burning [Dull/Aching] : dull/aching [Tightness] : tightness [Squeezing] : squeezing [Constant] : constant [Meds] : meds [Physical therapy] : physical therapy [Walking] : walking [de-identified] : 5-13-22- She states 3 1/2 year h/o right sided lower back pain with radicular complaints down the right leg to the foot. Initially saw ortho put her in therapy without help. she notes over the last 2 months had a trip to [place] and leg symptoms into the foot more problematic with walking and driving. she has tried apap and advil without help. Has been going to a new hands on therapy place with some improvement in symptoms\par \par [facility] denies\par \par retired [] : no [FreeTextEntry1] : rt knee/rt foot [FreeTextEntry5] : patient states pain started 3 years ago. denies any injury or fall  [FreeTextEntry7] : down the legs [de-identified] : patient reports the prednisone helped with the pain, but patient felt sick so she stopped taking them

## 2022-05-20 NOTE — REASON FOR VISIT
[FreeTextEntry2] : Had MRI: Multilevel lower thoracic and lumbar degenerative disc disease and facet osteoarthrosis with a mild \par levoscoliosis and degenerative retrolisthesis at L2-3.\par Disc herniation at L1-2, endplate osteophytes and a disc bulge at L2-3 and a disc bulge at L3-4 without stenosis \par or nerve root compression.\par Mild central stenosis at L4-5.\par Endplate osteophytes and facet osteoarthrosis at L5-S1 without stenosis or nerve root compression.\par Modic type II endplate changes adjacent to the L2-3 and L5-S1 discs.

## 2022-05-20 NOTE — PHYSICAL EXAM
[Flexion] : flexion [Extension] : extension [Bending to left] : bending to left [Bending to right] : bending to right [Facet arthropathy] : Facet arthropathy [Disc space narrowing] : Disc space narrowing [Spondylolithesis] : Spondylolithesis [AP] : anteroposterior [There are no fractures, subluxations or dislocations. No significant abnormalities are seen] : There are no fractures, subluxations or dislocations. No significant abnormalities are seen [Right] : right foot and ankle [de-identified] : radicular complaints posterior right leg into the foot  [FreeTextEntry1] : subtle listehsis L4/5 [] : no swelling [FreeTextEntry8] : tender dorsal mid mid foot

## 2022-05-20 NOTE — IMAGING
[FreeTextEntry1] : multi level narrowing worse at 5-1 and 2-3\par spondylolisthesis at 4-5 [de-identified] : normal [de-identified] : normal

## 2022-05-20 NOTE — DISCUSSION/SUMMARY
[de-identified] : MRI reviewed, no surgical issues. Will start conservative course of PT and see how she progresses

## 2022-06-17 ENCOUNTER — APPOINTMENT (OUTPATIENT)
Dept: ORTHOPEDIC SURGERY | Facility: CLINIC | Age: 77
End: 2022-06-17

## 2022-08-30 ENCOUNTER — APPOINTMENT (OUTPATIENT)
Dept: ORTHOPEDIC SURGERY | Facility: CLINIC | Age: 77
End: 2022-08-30

## 2022-08-30 VITALS
BODY MASS INDEX: 27.49 KG/M2 | WEIGHT: 165 LBS | SYSTOLIC BLOOD PRESSURE: 169 MMHG | HEART RATE: 92 BPM | HEIGHT: 65 IN | DIASTOLIC BLOOD PRESSURE: 84 MMHG

## 2022-08-30 DIAGNOSIS — Z78.9 OTHER SPECIFIED HEALTH STATUS: ICD-10-CM

## 2022-08-30 DIAGNOSIS — M43.16 SPONDYLOLISTHESIS, LUMBAR REGION: ICD-10-CM

## 2022-08-30 DIAGNOSIS — M51.36 OTHER INTERVERTEBRAL DISC DEGENERATION, LUMBAR REGION: ICD-10-CM

## 2022-08-30 DIAGNOSIS — M67.951 UNSPECIFIED DISORDER OF SYNOVIUM AND TENDON, RIGHT THIGH: ICD-10-CM

## 2022-08-30 PROCEDURE — 96372 THER/PROPH/DIAG INJ SC/IM: CPT

## 2022-08-30 PROCEDURE — 99204 OFFICE O/P NEW MOD 45 MIN: CPT | Mod: 25

## 2022-08-30 PROCEDURE — 72100 X-RAY EXAM L-S SPINE 2/3 VWS: CPT

## 2022-08-31 NOTE — HISTORY OF PRESENT ILLNESS
[de-identified] : 76-year-old female focal right groin pain for the last several months. She is limping. She is having difficulty going up and down stairs goes up the stairs with one leg at a time. She moves her own right leg using her upper extremities when getting in motor vehicle and she has difficulty tying her shoes. Pain is worse with walking, but standing up from sitting.It radiates downward to her knee and anterior shin at times. Pain rates 7/10 on a regular basis. It does also radiate to the right buttock.She is taking Tylenol with mild relief.she goes to chiropractic for her back pain and states that it works excellently, her pain is under control.She had recent lumbar MRI from [facility] here for review\par Past medical social allergies and family history was reviewed

## 2022-08-31 NOTE — DISCUSSION/SUMMARY
[Medication Risks Reviewed] : Medication risks reviewed [de-identified] : Conservative treatment was discussed with the patient at length. Anticipatory guidance regarding disease process, avoidance of acute exacerbation this was discussed at length and all patients commenting concerns were answered to the patient's satisfaction. Physical therapy for decrease pain and increase function was ordered. Patient was given home exercises as approved by [employer] and works well held directed toward this particular process. Intermittent use of acetaminophen 500 mg 2 tablets t.i.d. p.r.n. mild to moderate pain, ibuprofen 600 mg t.i.d. p.r.n. severe pain with food or milk if there is no medical contraindication. Medrol Dosepak for anti-inflammatory properties as prescribed.Home exercise including stretching on a daily basis for 20-30 minutes was recommended. Heat, ice, topical were discussed as needed. The patient will followup in 6-8 weeks at which point in time if symptoms continue we will order MRI studies Upper lumbar spine and right hip to guide treatment plan including possible injection therapy with pain management versus surgical option.\par Patient did very well with her gluteus trigger point.Next visit we will obtain an AP And lateral hip x-ray on the right.  She will be referred to hip surgeon as necessary.

## 2022-08-31 NOTE — PROCEDURE
[de-identified] : ketorolac injection 30 mg, Depo-Medrol 40 mg was given intramuscularly in the right buttock after alcohol swab x3. 18-gauge needle was used to draw out the medication, new needle was changed a 22-gauge afterward. Patient tolerated well without any adverse effects.

## 2022-08-31 NOTE — PHYSICAL EXAM
[de-identified] : Lumbar exam:\par CONSTITUTIONAL: The patient is a very pleasant individual who is well-nourished and who appears stated age.\par PSYCHIATRIC: The patient is alert and oriented X 3 and in no apparent distress, and participates with orthopedic evaluation well.\par HEAD: Atraumatic and is nonsyndromic in appearance.\par EENT: No visible thyromegaly, EOMI.\par RESPIRATORY: Respiratory rate is regular, not dyspneic on examination.\par LYMPHATICS: There is no inguinal lymphadenopathy\par INTEGUMENTARY: Skin is clean, dry, and intact about the bilateral lower extremities and lumbar spine.\par VASCULAR: There is brisk capillary refill about the bilateral lower extremities.\par NEUROLOGIC: There are no pathologic reflexes. There is no decrease in sensation of the bilateral lower extremities on Wartenberg pinwheel/ manual examination. Deep tendon reflexes are well maintained at 2+/4 of the bilateral lower extremities and are symmetric..\par MUSCULOSKELETAL: There is no visible muscular atrophy. Manual motor strength is well maintained in the bilateral lower extremities. Range of motion of lumbar spine is well maintained. The patient ambulates in a non-myelopathic manner. Negative tension sign and straight leg raise bilaterally. Quad extension, ankle dorsiflexion, EHL, plantar flexion, and ankle eversion are well preserved. Normal secondary orthopaedic exam of bilateral  greater trochanteric area, knees and ankles\par Exam of her left eye focal groin pain on right hip flexion and external rotation. There is point tenderness to palpation about the right upper tendon and careful Ms. [de-identified] : X-ray done today of the lumbar spine demonstrates degenerative scoliosis and spondylosis, disc degeneration at L3-L4 is most significant however foramina at L1-L2, L2-L3, L3-L4 appear patent.\par \par We are unable to open the MRI study from [facility] performed on May 13, 2022. We do have a report. Dictated reportstates multilevel lower thoracic and lumbar degenerative disc disease and facet osteoarthrosis with mild levoscoliosis and degenerative retrolisthesis of L2-L3. Disc herniation L1-L2, and plate osteophytes in the distal tip of L2-L3 and a distal check L3-L4 without stenosis or nerve root compression.Mild central canal stenosis at L4-L5.

## 2022-09-02 ENCOUNTER — NON-APPOINTMENT (OUTPATIENT)
Age: 77
End: 2022-09-02

## 2022-09-02 RX ORDER — LEVOTHYROXINE SODIUM 88 UG/1
88 TABLET ORAL
Qty: 90 | Refills: 0 | Status: ACTIVE | COMMUNITY
Start: 2022-05-27

## 2022-09-02 RX ORDER — IBUPROFEN 800 MG/1
TABLET, FILM COATED ORAL
Refills: 0 | Status: ACTIVE | COMMUNITY

## 2022-09-02 RX ORDER — GABAPENTIN 300 MG/1
300 CAPSULE ORAL
Qty: 60 | Refills: 0 | Status: ACTIVE | COMMUNITY
Start: 2022-08-22

## 2022-09-02 RX ORDER — LOSARTAN POTASSIUM 50 MG/1
50 TABLET, FILM COATED ORAL
Qty: 180 | Refills: 0 | Status: ACTIVE | COMMUNITY
Start: 2022-05-27

## 2022-09-08 RX ORDER — NAPROXEN 500 MG/1
500 TABLET ORAL
Qty: 60 | Refills: 0 | Status: ACTIVE | COMMUNITY
Start: 2022-09-08 | End: 1900-01-01

## 2022-09-12 DIAGNOSIS — M25.551 PAIN IN RIGHT HIP: ICD-10-CM

## 2022-09-13 ENCOUNTER — APPOINTMENT (OUTPATIENT)
Dept: ORTHOPEDIC SURGERY | Facility: CLINIC | Age: 77
End: 2022-09-13

## 2022-09-13 VITALS
DIASTOLIC BLOOD PRESSURE: 68 MMHG | WEIGHT: 160 LBS | BODY MASS INDEX: 26.66 KG/M2 | HEART RATE: 69 BPM | SYSTOLIC BLOOD PRESSURE: 162 MMHG | HEIGHT: 65 IN

## 2022-09-13 DIAGNOSIS — M16.11 UNILATERAL PRIMARY OSTEOARTHRITIS, RIGHT HIP: ICD-10-CM

## 2022-09-13 PROCEDURE — 99213 OFFICE O/P EST LOW 20 MIN: CPT

## 2022-09-13 PROCEDURE — 73502 X-RAY EXAM HIP UNI 2-3 VIEWS: CPT

## 2022-09-13 NOTE — DISCUSSION/SUMMARY
[Medication Risks Reviewed] : Medication risks reviewed [Surgical risks reviewed] : Surgical risks reviewed [de-identified] : Patient is a 76-year-old female with severe right hip osteoarthritis presenting today for initial evaluation.  She has had good response to physical therapy and NSAID use and would like to continue with this.  I recommended she continue take the naproxen as needed for the pain.  I recommend that she continue with her physical therapy.  We did discuss the possibility of cortisone injections versus total hip arthroplasty in the future.  All questions were asked and answered.  I will see her back on an as-needed basis for her right hip.

## 2022-09-13 NOTE — PHYSICAL EXAM
[de-identified] : GENERAL APPEARANCE: Well nourished and hydrated, pleasant, alert, and oriented x 3. Appears their stated age. \par HEENT: Normocephalic, extraocular eye motion intact. Nasal septum midline. Oral cavity clear. External auditory canal clear. \par RESPIRATORY: Breath sounds clear and audible in all lobes. No wheezing, No accessory muscle use.\par CARDIOVASCULAR: No apparent abnormalities. No lower leg edema. No varicosities. Pedal pulses are palpable.\par NEUROLOGIC: Sensation is normal, no muscle weakness in the upper or lower extremities.\par DERMATOLOGIC: No apparent skin lesions, moist, warm, no rash.\par SPINE: Cervical spine appears normal and moves freely; thoracic spine appears normal and moves freely; there is pain with range of motion lumbar spine\par MUSCULOSKELETAL: Hands, wrists, and elbows are normal and move freely, shoulders are normal and move freely. \par Psychiatric: Oriented to person, place, and time, insight and judgement were intact and the affect was normal. \par Musculoskeletal: ambulates normal. Right hip exam showed no groin pain with SLR, ROM is full with pain extreme, FELICIANO's negative, FADIR positive\par 5/5 motor strength in bilateral lower extremities. Sensory: Intact in bilateral lower extremities. DTRs: Biceps, brachioradialis, triceps, patellar, ankle and plantar 2+ and symmetric bilaterally. Pulses: dorsalis pedis, posterior tibial, femoral, popliteal, and radial 2+ and symmetric bilaterally. \par  [de-identified] : AP pelvis and 2 views of the right hip obtained the office today show no acute fracture or dislocation.  There is severe joint space narrowing bone-on-bone osteoarthritis subchondral sclerosis consistent with severe right hip osteoarthritis.

## 2022-09-13 NOTE — HISTORY OF PRESENT ILLNESS
[Improving] : improving [Standing] : standing [Daily] : ~He/She~ states the symptoms seem to be occuring daily [Bending] : worsened by bending [Direct Pressure] : worsened by direct pressure [Hip Movement] : worsened by hip movement [Sitting] : worsened by sitting [Walking] : worsened by walking [Acetaminophen] : relieved by acetaminophen [Chiropractic] : relieved by chiropractic manipulation [Exercise Regimen] : relieved by exercise regimen [Heat] : relieved by heat [Ice] : relieved by ice [NSAIDs] : relieved by nonsteroidal anti-inflammatory drugs [Physical Therapy] : relieved by physical therapy [Rest] : relieved by rest [de-identified] : 76-year-old female with past medical history of hypertension and hypothyroidism presents to the office for initial evaluation of right hip pain.  Patient states that she has been having hip and back pain since the beginning of the summer.  Her pain originates in her back and goes down her entire leg and sometimes radiates into the groin.  She states that she has been seeing a spine surgeon for her back and has started physical therapy yesterday with very good relief and improvement of her pain and symptoms.  She states that on her lumbar imaging they saw some arthritis of the hip and wanted her to follow-up with an orthopedic hip specialist.  She is also being seen by a chiropractor who has helped relieve her pain as well.  Aside from the shooting pain down her right lower extremity she also has pain with walking, ascending/descending stairs, rising from a seated position.  She occasionally uses a cane for ambulation but has not required 1 since starting physical therapy yesterday.  She takes Tylenol naproxen and Advil for the pain with good relief.  She states she has had lumbar injection but never any injections or surgeries in the hips.  Denies any mechanical symptoms of the hip, numbness/tingling in the leg, recent injuries or falls. [Knee Flexion] : not worsened by knee flexion [Knee Extension] : not worsened by knee extension

## 2022-09-16 RX ORDER — MELOXICAM 7.5 MG/1
7.5 TABLET ORAL TWICE DAILY
Qty: 42 | Refills: 0 | Status: ACTIVE | COMMUNITY
Start: 2022-09-16 | End: 1900-01-01

## 2022-09-19 ENCOUNTER — RX CHANGE (OUTPATIENT)
Age: 77
End: 2022-09-19

## 2022-09-19 RX ORDER — MELOXICAM 15 MG/1
15 TABLET ORAL
Qty: 21 | Refills: 0 | Status: ACTIVE | COMMUNITY
Start: 2022-09-19 | End: 1900-01-01

## 2022-10-06 ENCOUNTER — APPOINTMENT (OUTPATIENT)
Dept: ORTHOPEDIC SURGERY | Facility: CLINIC | Age: 77
End: 2022-10-06

## 2023-03-23 NOTE — ED CDU PROVIDER SUBSEQUENT DAY NOTE - HISTORY
Yes
Patient seen at bedside in NAD.  VSS.  Patient resting comfortably without complaints. No events noted over tele -She Daugherty PA-C

## 2024-11-02 NOTE — ED PROVIDER NOTE - CARDIAC, MLM
Normal rate, regular rhythm.  Heart sounds S1, S2.  No murmurs, rubs or gallops. 2+ distal pulses in extremities b/l
No

## 2025-04-10 NOTE — ED CDU PROVIDER SUBSEQUENT DAY NOTE - EYES NEGATIVE STATEMENT, MLM
Anesthesia Post-op Note    Patient: Veronica Cates  Procedure(s) Performed: RIGHT BREAST WIDE EXCISION FIBROADENOMA WITH INTRAOPERATIVE NEEDLE LOCALIZATION (Right: Breast)  Anesthesia type: General    Vitals Value Taken Time   Temp 36.2 °C (97.2 °F) 04/10/25 1021   Pulse 89 04/10/25 1021   Resp 17 04/10/25 1021   SpO2 99 % 04/10/25 1021   /84 04/10/25 1021         Patient Location: Phase II  Post-op Vital Signs:stable  Level of Consciousness: participates in exam, awake and alert  Respiratory Status: spontaneous ventilation  Cardiovascular blood pressure returned to baseline  Hydration: euvolemic  Pain Management: well controlled  Vomiting: none  Nausea: None  Airway Patency:patent  Post-op Assessment: awake, alert, appropriately conversant, or baseline, no complications, patient tolerated procedure well, no evidence of recall, dentition, mouth, tongue, and oropharynx unchanged , moving all extremities and No Corneal Abrasion      No notable events documented.                      
no discharge, no irritation, no pain, no redness, and no visual changes.